# Patient Record
Sex: FEMALE | Race: WHITE
[De-identification: names, ages, dates, MRNs, and addresses within clinical notes are randomized per-mention and may not be internally consistent; named-entity substitution may affect disease eponyms.]

---

## 2017-02-14 ENCOUNTER — HOSPITAL ENCOUNTER (OUTPATIENT)
Dept: HOSPITAL 53 - M WUC | Age: 52
End: 2017-02-14
Attending: FAMILY MEDICINE
Payer: COMMERCIAL

## 2017-02-14 DIAGNOSIS — E03.9: Primary | ICD-10-CM

## 2017-02-14 LAB
CHOLEST SERPL-MCNC: 229 MG/DL (ref ?–200)
TRIGL SERPL-MCNC: 170 MG/DL (ref ?–150)

## 2017-07-11 ENCOUNTER — HOSPITAL ENCOUNTER (EMERGENCY)
Dept: HOSPITAL 53 - M ED | Age: 52
LOS: 1 days | Discharge: HOME | End: 2017-07-12
Payer: COMMERCIAL

## 2017-07-11 VITALS — HEIGHT: 63 IN | BODY MASS INDEX: 22.77 KG/M2 | WEIGHT: 128.53 LBS

## 2017-07-11 VITALS — SYSTOLIC BLOOD PRESSURE: 132 MMHG | DIASTOLIC BLOOD PRESSURE: 72 MMHG

## 2017-07-11 DIAGNOSIS — Z79.899: ICD-10-CM

## 2017-07-11 DIAGNOSIS — E11.9: ICD-10-CM

## 2017-07-11 DIAGNOSIS — E03.9: ICD-10-CM

## 2017-07-11 DIAGNOSIS — Z91.041: ICD-10-CM

## 2017-07-11 DIAGNOSIS — F17.210: ICD-10-CM

## 2017-07-11 DIAGNOSIS — L50.9: Primary | ICD-10-CM

## 2017-08-23 ENCOUNTER — HOSPITAL ENCOUNTER (OUTPATIENT)
Dept: HOSPITAL 53 - M WUC | Age: 52
End: 2017-08-23
Attending: FAMILY MEDICINE
Payer: COMMERCIAL

## 2017-08-23 DIAGNOSIS — E55.9: Primary | ICD-10-CM

## 2017-08-23 DIAGNOSIS — E03.9: ICD-10-CM

## 2017-08-23 DIAGNOSIS — R53.83: ICD-10-CM

## 2018-01-29 ENCOUNTER — HOSPITAL ENCOUNTER (OUTPATIENT)
Dept: HOSPITAL 53 - M WUC | Age: 53
End: 2018-01-29
Attending: FAMILY MEDICINE
Payer: COMMERCIAL

## 2018-01-29 DIAGNOSIS — E55.9: Primary | ICD-10-CM

## 2018-01-29 DIAGNOSIS — Z86.11: ICD-10-CM

## 2018-01-29 LAB — THYROID STIMULATING HORMONE: 0.54 UIU/ML (ref 0.36–3.74)

## 2018-01-29 PROCEDURE — 84443 ASSAY THYROID STIM HORMONE: CPT

## 2019-09-05 ENCOUNTER — HOSPITAL ENCOUNTER (EMERGENCY)
Dept: HOSPITAL 53 - M ED | Age: 54
Discharge: HOME | End: 2019-09-05
Payer: SELF-PAY

## 2019-09-05 VITALS — BODY MASS INDEX: 20.6 KG/M2 | WEIGHT: 116.25 LBS | HEIGHT: 63 IN

## 2019-09-05 VITALS — DIASTOLIC BLOOD PRESSURE: 78 MMHG | SYSTOLIC BLOOD PRESSURE: 142 MMHG

## 2019-09-05 DIAGNOSIS — Z91.041: ICD-10-CM

## 2019-09-05 DIAGNOSIS — Z79.899: ICD-10-CM

## 2019-09-05 DIAGNOSIS — M54.5: Primary | ICD-10-CM

## 2019-09-05 DIAGNOSIS — Z72.0: ICD-10-CM

## 2019-09-05 DIAGNOSIS — Z91.89: ICD-10-CM

## 2019-09-05 DIAGNOSIS — Z91.030: ICD-10-CM

## 2019-09-05 PROCEDURE — 96375 TX/PRO/DX INJ NEW DRUG ADDON: CPT

## 2019-09-05 PROCEDURE — 99284 EMERGENCY DEPT VISIT MOD MDM: CPT

## 2019-09-05 PROCEDURE — 96374 THER/PROPH/DIAG INJ IV PUSH: CPT

## 2020-06-09 ENCOUNTER — HOSPITAL ENCOUNTER (OUTPATIENT)
Dept: HOSPITAL 53 - M WUC | Age: 55
End: 2020-06-09
Attending: NURSE PRACTITIONER
Payer: COMMERCIAL

## 2020-06-09 DIAGNOSIS — E07.9: ICD-10-CM

## 2020-06-09 DIAGNOSIS — Z13.220: ICD-10-CM

## 2020-06-09 DIAGNOSIS — Z13.0: Primary | ICD-10-CM

## 2020-06-09 DIAGNOSIS — Z71.7: ICD-10-CM

## 2020-06-09 DIAGNOSIS — E55.9: ICD-10-CM

## 2020-06-09 LAB
25(OH)D3 SERPL-MCNC: 23.7 NG/ML (ref 30–100)
ALBUMIN SERPL BCG-MCNC: 3.8 GM/DL (ref 3.2–5.2)
ALT SERPL W P-5'-P-CCNC: 26 U/L (ref 12–78)
BILIRUB SERPL-MCNC: 0.4 MG/DL (ref 0.2–1)
BUN SERPL-MCNC: 16 MG/DL (ref 7–18)
CALCIUM SERPL-MCNC: 9 MG/DL (ref 8.5–10.1)
CHLORIDE SERPL-SCNC: 106 MEQ/L (ref 98–107)
CHOLEST SERPL-MCNC: 270 MG/DL (ref ?–200)
CHOLEST/HDLC SERPL: 4.5 {RATIO} (ref ?–5)
CO2 SERPL-SCNC: 29 MEQ/L (ref 21–32)
CREAT SERPL-MCNC: 1.06 MG/DL (ref 0.55–1.3)
GFR SERPL CREATININE-BSD FRML MDRD: 57.1 ML/MIN/{1.73_M2} (ref 51–?)
GLUCOSE SERPL-MCNC: 74 MG/DL (ref 70–100)
HCT VFR BLD AUTO: 39.4 % (ref 36–47)
HDLC SERPL-MCNC: 60 MG/DL (ref 40–?)
HGB BLD-MCNC: 13 G/DL (ref 12–15.5)
LDLC SERPL CALC-MCNC: 193 MG/DL (ref ?–100)
MCH RBC QN AUTO: 32.5 PG (ref 27–33)
MCHC RBC AUTO-ENTMCNC: 33 G/DL (ref 32–36.5)
MCV RBC AUTO: 98.5 FL (ref 80–96)
NONHDLC SERPL-MCNC: 210 MG/DL
PLATELET # BLD AUTO: 325 10^3/UL (ref 150–450)
POTASSIUM SERPL-SCNC: 4.3 MEQ/L (ref 3.5–5.1)
PROT SERPL-MCNC: 7.1 GM/DL (ref 6.4–8.2)
RBC # BLD AUTO: 4 10^6/UL (ref 4–5.4)
SODIUM SERPL-SCNC: 138 MEQ/L (ref 136–145)
T4 SERPL-MCNC: 1.8 UG/DL (ref 4.5–12)
TRIGL SERPL-MCNC: 86 MG/DL (ref ?–150)
TSH SERPL DL<=0.005 MIU/L-ACNC: 69 UIU/ML (ref 0.36–3.74)
WBC # BLD AUTO: 9.4 10^3/UL (ref 4–10)

## 2020-11-02 ENCOUNTER — HOSPITAL ENCOUNTER (OUTPATIENT)
Dept: HOSPITAL 53 - M LAB REF | Age: 55
End: 2020-11-02
Attending: PHYSICIAN ASSISTANT
Payer: COMMERCIAL

## 2020-11-02 DIAGNOSIS — L57.0: Primary | ICD-10-CM

## 2021-01-05 ENCOUNTER — HOSPITAL ENCOUNTER (OUTPATIENT)
Dept: HOSPITAL 53 - M WUC | Age: 56
End: 2021-01-05
Attending: NURSE PRACTITIONER
Payer: COMMERCIAL

## 2021-01-05 DIAGNOSIS — E07.9: Primary | ICD-10-CM

## 2021-01-05 LAB
FT4I SERPL CALC-MCNC: 3.5 % (ref 1.3–4.8)
T3 SERPL-MCNC: 84.3 NG/DL (ref 60–181)
T3RU NFR SERPL: 36 % (ref 30–39)
T4 FREE SERPL-MCNC: 1.43 NG/DL (ref 0.76–1.46)
T4 SERPL-MCNC: 9.6 UG/DL (ref 4.5–12)
TSH SERPL DL<=0.005 MIU/L-ACNC: 0.28 UIU/ML (ref 0.36–3.74)

## 2021-03-18 ENCOUNTER — HOSPITAL ENCOUNTER (OUTPATIENT)
Dept: HOSPITAL 53 - M WUC | Age: 56
End: 2021-03-18
Attending: NURSE PRACTITIONER
Payer: COMMERCIAL

## 2021-03-18 DIAGNOSIS — R94.6: Primary | ICD-10-CM

## 2021-03-18 LAB
T4 FREE SERPL-MCNC: 1.4 NG/DL (ref 0.76–1.46)
TSH SERPL DL<=0.005 MIU/L-ACNC: 0.34 UIU/ML (ref 0.36–3.74)

## 2021-04-12 ENCOUNTER — HOSPITAL ENCOUNTER (OUTPATIENT)
Dept: HOSPITAL 53 - M WUC | Age: 56
End: 2021-04-12
Attending: PHYSICIAN ASSISTANT
Payer: COMMERCIAL

## 2021-04-12 DIAGNOSIS — M54.6: Primary | ICD-10-CM

## 2021-04-12 NOTE — REP
INDICATION:

PAIN.



COMPARISON:

None.



TECHNIQUE:

Plain films of the thoracic spine.



FINDINGS:

No fracture or malalignment.  Vertebral heights are overall preserved.  There is

degenerative disc disease with loss of disc height.

Visualized lungs are clear.

Soft tissues are unremarkable.



IMPRESSION:

No fracture or malalignment.  Degenerative changes.





<Electronically signed by Juan Monreal > 04/12/21 0934

## 2021-07-02 ENCOUNTER — HOSPITAL ENCOUNTER (EMERGENCY)
Dept: HOSPITAL 53 - M ED | Age: 56
Discharge: HOME | End: 2021-07-02
Payer: COMMERCIAL

## 2021-07-02 VITALS — WEIGHT: 128.53 LBS | HEIGHT: 63 IN | BODY MASS INDEX: 22.77 KG/M2

## 2021-07-02 VITALS — DIASTOLIC BLOOD PRESSURE: 79 MMHG | SYSTOLIC BLOOD PRESSURE: 124 MMHG

## 2021-07-02 DIAGNOSIS — F17.200: ICD-10-CM

## 2021-07-02 DIAGNOSIS — Z79.899: ICD-10-CM

## 2021-07-02 DIAGNOSIS — Z91.041: ICD-10-CM

## 2021-07-02 DIAGNOSIS — M54.9: Primary | ICD-10-CM

## 2021-07-02 DIAGNOSIS — Z91.89: ICD-10-CM

## 2021-07-02 DIAGNOSIS — Z91.030: ICD-10-CM

## 2021-07-02 LAB
BASOPHILS # BLD AUTO: 0.1 10^3/UL (ref 0–0.2)
BASOPHILS NFR BLD AUTO: 0.9 % (ref 0–1)
EOSINOPHIL # BLD AUTO: 0.2 10^3/UL (ref 0–0.5)
EOSINOPHIL NFR BLD AUTO: 1.8 % (ref 0–3)
HCT VFR BLD AUTO: 40 % (ref 36–47)
HGB BLD-MCNC: 13.1 G/DL (ref 12–15.5)
LYMPHOCYTES # BLD AUTO: 3.1 10^3/UL (ref 1.5–5)
LYMPHOCYTES NFR BLD AUTO: 31.1 % (ref 24–44)
MCH RBC QN AUTO: 29.6 PG (ref 27–33)
MCHC RBC AUTO-ENTMCNC: 32.8 G/DL (ref 32–36.5)
MCV RBC AUTO: 90.5 FL (ref 80–96)
MONOCYTES # BLD AUTO: 0.6 10^3/UL (ref 0–0.8)
MONOCYTES NFR BLD AUTO: 5.6 % (ref 2–8)
NEUTROPHILS # BLD AUTO: 6 10^3/UL (ref 1.5–8.5)
NEUTROPHILS NFR BLD AUTO: 60.2 % (ref 36–66)
PLATELET # BLD AUTO: 330 10^3/UL (ref 150–450)
RBC # BLD AUTO: 4.42 10^6/UL (ref 4–5.4)
WBC # BLD AUTO: 10 10^3/UL (ref 4–10)

## 2021-07-12 ENCOUNTER — HOSPITAL ENCOUNTER (OUTPATIENT)
Dept: HOSPITAL 53 - M WUC | Age: 56
End: 2021-07-12
Attending: NURSE PRACTITIONER
Payer: COMMERCIAL

## 2021-07-12 DIAGNOSIS — E89.0: Primary | ICD-10-CM

## 2021-07-12 LAB
T4 FREE SERPL-MCNC: 1.47 NG/DL (ref 0.76–1.46)
TSH SERPL DL<=0.005 MIU/L-ACNC: 0.5 UIU/ML (ref 0.36–3.74)

## 2021-09-30 ENCOUNTER — HOSPITAL ENCOUNTER (OUTPATIENT)
Dept: HOSPITAL 53 - M WUC | Age: 56
End: 2021-09-30
Attending: ORTHOPAEDIC SURGERY
Payer: COMMERCIAL

## 2021-09-30 DIAGNOSIS — M51.34: Primary | ICD-10-CM

## 2021-09-30 LAB
BASOPHILS # BLD AUTO: 0.1 10^3/UL (ref 0–0.2)
BASOPHILS NFR BLD AUTO: 1.4 % (ref 0–1)
CRP SERPL-MCNC: < 0.3 MG/DL (ref 0–0.3)
EOSINOPHIL # BLD AUTO: 0.1 10^3/UL (ref 0–0.5)
EOSINOPHIL NFR BLD AUTO: 1.6 % (ref 0–3)
ERYTHROCYTE [SEDIMENTATION RATE] IN BLOOD BY WESTERGREN METHOD: 11 MM/HR (ref 0–30)
HCT VFR BLD AUTO: 45.7 % (ref 36–47)
HGB BLD-MCNC: 14.7 G/DL (ref 12–15.5)
LYMPHOCYTES # BLD AUTO: 3.6 10^3/UL (ref 1.5–5)
LYMPHOCYTES NFR BLD AUTO: 43.7 % (ref 24–44)
MCH RBC QN AUTO: 29.7 PG (ref 27–33)
MCHC RBC AUTO-ENTMCNC: 32.2 G/DL (ref 32–36.5)
MCV RBC AUTO: 92.3 FL (ref 80–96)
MONOCYTES # BLD AUTO: 0.4 10^3/UL (ref 0–0.8)
MONOCYTES NFR BLD AUTO: 4.9 % (ref 2–8)
NEUTROPHILS # BLD AUTO: 4 10^3/UL (ref 1.5–8.5)
NEUTROPHILS NFR BLD AUTO: 48.2 % (ref 36–66)
PLATELET # BLD AUTO: 382 10^3/UL (ref 150–450)
PROT SERPL-MCNC: 7.2 GM/DL (ref 6.4–8.2)
RBC # BLD AUTO: 4.95 10^6/UL (ref 4–5.4)
RHEUMATOID FACT SERPL-ACNC: < 10 IU/ML (ref ?–15)
WBC # BLD AUTO: 8.3 10^3/UL (ref 4–10)

## 2022-01-07 ENCOUNTER — HOSPITAL ENCOUNTER (OUTPATIENT)
Dept: HOSPITAL 53 - M WUC | Age: 57
End: 2022-01-07
Attending: NURSE PRACTITIONER
Payer: COMMERCIAL

## 2022-01-07 DIAGNOSIS — E89.0: Primary | ICD-10-CM

## 2022-01-07 LAB
T4 FREE SERPL-MCNC: 1.24 NG/DL (ref 0.76–1.46)
TSH SERPL DL<=0.005 MIU/L-ACNC: 1.2 UIU/ML (ref 0.36–3.74)

## 2022-03-16 ENCOUNTER — HOSPITAL ENCOUNTER (OUTPATIENT)
Dept: HOSPITAL 53 - M PLALAB | Age: 57
End: 2022-03-16
Attending: NURSE PRACTITIONER
Payer: COMMERCIAL

## 2022-03-16 DIAGNOSIS — Z20.1: Primary | ICD-10-CM

## 2022-03-16 DIAGNOSIS — E55.9: ICD-10-CM

## 2022-07-07 ENCOUNTER — HOSPITAL ENCOUNTER (OUTPATIENT)
Dept: HOSPITAL 53 - M PLALAB | Age: 57
End: 2022-07-07
Attending: NURSE PRACTITIONER
Payer: COMMERCIAL

## 2022-07-07 DIAGNOSIS — E89.0: ICD-10-CM

## 2022-07-07 DIAGNOSIS — E78.2: ICD-10-CM

## 2022-07-07 DIAGNOSIS — E55.9: Primary | ICD-10-CM

## 2022-07-07 LAB
25(OH)D3 SERPL-MCNC: 56.2 NG/ML (ref 30–100)
ALBUMIN SERPL BCG-MCNC: 3.7 GM/DL (ref 3.2–5.2)
ALT SERPL W P-5'-P-CCNC: 21 U/L (ref 12–78)
BILIRUB SERPL-MCNC: 0.2 MG/DL (ref 0.2–1)
BUN SERPL-MCNC: 11 MG/DL (ref 7–18)
CALCIUM SERPL-MCNC: 9.6 MG/DL (ref 8.5–10.1)
CHLORIDE SERPL-SCNC: 106 MEQ/L (ref 98–107)
CHOLEST SERPL-MCNC: 302 MG/DL (ref ?–200)
CHOLEST/HDLC SERPL: 5.12 {RATIO} (ref ?–5)
CO2 SERPL-SCNC: 28 MEQ/L (ref 21–32)
CREAT SERPL-MCNC: 0.85 MG/DL (ref 0.55–1.3)
GFR SERPL CREATININE-BSD FRML MDRD: > 60 ML/MIN/{1.73_M2} (ref 51–?)
GLUCOSE SERPL-MCNC: 87 MG/DL (ref 70–100)
HDLC SERPL-MCNC: 59 MG/DL (ref 40–?)
LDLC SERPL CALC-MCNC: 224 MG/DL (ref ?–100)
NONHDLC SERPL-MCNC: 243 MG/DL
POTASSIUM SERPL-SCNC: 4.4 MEQ/L (ref 3.5–5.1)
PROT SERPL-MCNC: 7 GM/DL (ref 6.4–8.2)
SODIUM SERPL-SCNC: 141 MEQ/L (ref 136–145)
TRIGL SERPL-MCNC: 95 MG/DL (ref ?–150)
TSH SERPL DL<=0.005 MIU/L-ACNC: 0.97 UIU/ML (ref 0.36–3.74)

## 2023-01-16 ENCOUNTER — HOSPITAL ENCOUNTER (OUTPATIENT)
Dept: HOSPITAL 53 - M PLALAB | Age: 58
End: 2023-01-16
Attending: NURSE PRACTITIONER
Payer: COMMERCIAL

## 2023-01-16 DIAGNOSIS — E55.9: Primary | ICD-10-CM

## 2023-01-16 DIAGNOSIS — E89.0: ICD-10-CM

## 2023-01-16 DIAGNOSIS — E78.2: ICD-10-CM

## 2023-01-16 LAB
25(OH)D3 SERPL-MCNC: 48.6 NG/ML (ref 20–100)
ALBUMIN SERPL BCG-MCNC: 3.8 G/DL (ref 3.2–5.2)
ALP SERPL-CCNC: 121 U/L (ref 46–116)
ALT SERPL W P-5'-P-CCNC: 13 U/L (ref 7–40)
AST SERPL-CCNC: 19 U/L (ref ?–34)
BILIRUB SERPL-MCNC: 0.4 MG/DL (ref 0.3–1.2)
BUN SERPL-MCNC: 14 MG/DL (ref 9–23)
CALCIUM SERPL-MCNC: 9.7 MG/DL (ref 8.5–10.1)
CHLORIDE SERPL-SCNC: 105 MMOL/L (ref 98–107)
CO2 SERPL-SCNC: 27 MMOL/L (ref 20–31)
CREAT SERPL-MCNC: 0.87 MG/DL (ref 0.55–1.3)
GFR SERPL CREATININE-BSD FRML MDRD: > 60 ML/MIN/{1.73_M2} (ref 51–?)
GLUCOSE SERPL-MCNC: 89 MG/DL (ref 60–100)
POTASSIUM SERPL-SCNC: 4.4 MMOL/L (ref 3.5–5.1)
PROT SERPL-MCNC: 7.1 G/DL (ref 5.7–8.2)
SODIUM SERPL-SCNC: 139 MMOL/L (ref 136–145)
TSH SERPL DL<=0.005 MIU/L-ACNC: 0.25 UIU/ML (ref 0.55–4.78)

## 2023-03-16 ENCOUNTER — HOSPITAL ENCOUNTER (OUTPATIENT)
Dept: HOSPITAL 53 - M PLALAB | Age: 58
End: 2023-03-16
Attending: NURSE PRACTITIONER
Payer: COMMERCIAL

## 2023-03-16 DIAGNOSIS — E89.0: Primary | ICD-10-CM

## 2023-09-04 ENCOUNTER — HOSPITAL ENCOUNTER (EMERGENCY)
Dept: HOSPITAL 53 - M ED | Age: 58
Discharge: HOME | End: 2023-09-04
Payer: COMMERCIAL

## 2023-09-04 VITALS — DIASTOLIC BLOOD PRESSURE: 69 MMHG | OXYGEN SATURATION: 99 % | TEMPERATURE: 98.2 F | SYSTOLIC BLOOD PRESSURE: 156 MMHG

## 2023-09-04 DIAGNOSIS — Z79.82: ICD-10-CM

## 2023-09-04 DIAGNOSIS — S82.042A: Primary | ICD-10-CM

## 2023-09-04 DIAGNOSIS — M25.462: ICD-10-CM

## 2023-09-04 DIAGNOSIS — Z91.048: ICD-10-CM

## 2023-09-04 DIAGNOSIS — E03.9: ICD-10-CM

## 2023-09-04 DIAGNOSIS — Z91.041: ICD-10-CM

## 2023-09-04 DIAGNOSIS — Z91.030: ICD-10-CM
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
## 2023-09-04 DIAGNOSIS — F17.200: ICD-10-CM

## 2023-09-04 DIAGNOSIS — Z79.899: ICD-10-CM

## 2023-09-04 PROCEDURE — 99284 EMERGENCY DEPT VISIT MOD MDM: CPT

## 2023-09-04 PROCEDURE — 73552 X-RAY EXAM OF FEMUR 2/>: CPT

## 2023-09-04 PROCEDURE — 73564 X-RAY EXAM KNEE 4 OR MORE: CPT

## 2023-09-04 PROCEDURE — 96374 THER/PROPH/DIAG INJ IV PUSH: CPT

## 2023-09-04 PROCEDURE — 73521 X-RAY EXAM HIPS BI 2 VIEWS: CPT

## 2023-09-04 PROCEDURE — 96375 TX/PRO/DX INJ NEW DRUG ADDON: CPT

## 2023-09-07 ENCOUNTER — HOSPITAL ENCOUNTER (OUTPATIENT)
Dept: HOSPITAL 53 - M PLALAB | Age: 58
End: 2023-09-07
Attending: ORTHOPAEDIC SURGERY
Payer: COMMERCIAL

## 2023-09-07 DIAGNOSIS — S82.002A: Primary | ICD-10-CM

## 2023-09-07 LAB
ALBUMIN SERPL BCG-MCNC: 3.5 G/DL (ref 3.2–5.2)
ALP SERPL-CCNC: 119 U/L (ref 46–116)
ALT SERPL W P-5'-P-CCNC: 13 U/L (ref 7–40)
AST SERPL-CCNC: 15 U/L (ref ?–34)
BASOPHILS # BLD AUTO: 0.1 10^3/UL (ref 0–0.2)
BASOPHILS NFR BLD AUTO: 1.1 % (ref 0–1)
BILIRUB SERPL-MCNC: 0.5 MG/DL (ref 0.3–1.2)
BUN SERPL-MCNC: 10 MG/DL (ref 9–23)
CALCIUM SERPL-MCNC: 9.3 MG/DL (ref 8.3–10.6)
CHLORIDE SERPL-SCNC: 105 MMOL/L (ref 98–107)
CO2 SERPL-SCNC: 26 MMOL/L (ref 20–31)
CREAT SERPL-MCNC: 0.84 MG/DL (ref 0.55–1.3)
EOSINOPHIL # BLD AUTO: 0.1 10^3/UL (ref 0–0.5)
EOSINOPHIL NFR BLD AUTO: 0.9 % (ref 0–3)
GFR SERPL CREATININE-BSD FRML MDRD: > 60 ML/MIN/{1.73_M2} (ref 45–?)
GLUCOSE SERPL-MCNC: 85 MG/DL (ref 74–106)
HCT VFR BLD AUTO: 36.1 % (ref 36–47)
HGB BLD-MCNC: 11.6 G/DL (ref 12–15.5)
INR PPP: 1.06
LYMPHOCYTES # BLD AUTO: 3.2 10^3/UL (ref 1.5–5)
LYMPHOCYTES NFR BLD AUTO: 33.8 % (ref 24–44)
MCH RBC QN AUTO: 29.9 PG (ref 27–33)
MCHC RBC AUTO-ENTMCNC: 32.1 G/DL (ref 32–36.5)
MCV RBC AUTO: 93 FL (ref 80–96)
MONOCYTES # BLD AUTO: 0.6 10^3/UL (ref 0–0.8)
MONOCYTES NFR BLD AUTO: 6.7 % (ref 2–8)
NEUTROPHILS # BLD AUTO: 5.3 10^3/UL (ref 1.5–8.5)
NEUTROPHILS NFR BLD AUTO: 57.2 % (ref 36–66)
PLATELET # BLD AUTO: 327 10^3/UL (ref 150–450)
POTASSIUM SERPL-SCNC: 4.6 MMOL/L (ref 3.5–5.1)
PROT SERPL-MCNC: 6.6 G/DL (ref 5.7–8.2)
PROTHROMBIN TIME: 13.5 SECONDS (ref 12.5–14.5)
RBC # BLD AUTO: 3.88 10^6/UL (ref 4–5.4)
SODIUM SERPL-SCNC: 136 MMOL/L (ref 136–145)
WBC # BLD AUTO: 9.3 10^3/UL (ref 4–10)

## 2023-09-12 ENCOUNTER — HOSPITAL ENCOUNTER (OUTPATIENT)
Dept: HOSPITAL 53 - M SDC | Age: 58
Discharge: HOME | End: 2023-09-12
Attending: ORTHOPAEDIC SURGERY
Payer: COMMERCIAL

## 2023-09-12 VITALS — WEIGHT: 130 LBS | HEIGHT: 63 IN | BODY MASS INDEX: 23.04 KG/M2

## 2023-09-12 VITALS — OXYGEN SATURATION: 95 % | SYSTOLIC BLOOD PRESSURE: 141 MMHG | TEMPERATURE: 98.6 F | DIASTOLIC BLOOD PRESSURE: 71 MMHG

## 2023-09-12 DIAGNOSIS — Z91.030: ICD-10-CM

## 2023-09-12 DIAGNOSIS — F17.210: ICD-10-CM

## 2023-09-12 DIAGNOSIS — Z91.041: ICD-10-CM

## 2023-09-12 DIAGNOSIS — Z91.048: ICD-10-CM
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
## 2023-09-12 DIAGNOSIS — S82.042A: Primary | ICD-10-CM
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
## 2023-09-12 DIAGNOSIS — Z79.899: ICD-10-CM

## 2023-09-12 DIAGNOSIS — Z79.82: ICD-10-CM

## 2023-09-12 PROCEDURE — 76000 FLUOROSCOPY <1 HR PHYS/QHP: CPT

## 2023-09-12 PROCEDURE — 93005 ELECTROCARDIOGRAM TRACING: CPT

## 2023-09-12 PROCEDURE — 27524 TREAT KNEECAP FRACTURE: CPT

## 2023-09-12 RX ADMIN — HYDROMORPHONE HYDROCHLORIDE PRN MG: 1 INJECTION, SOLUTION INTRAMUSCULAR; INTRAVENOUS; SUBCUTANEOUS at 14:45

## 2023-09-12 RX ADMIN — HYDROMORPHONE HYDROCHLORIDE PRN MG: 1 INJECTION, SOLUTION INTRAMUSCULAR; INTRAVENOUS; SUBCUTANEOUS at 14:30

## 2023-10-09 ENCOUNTER — HOSPITAL ENCOUNTER (OUTPATIENT)
Dept: HOSPITAL 53 - M SOG | Age: 58
End: 2023-10-09
Attending: ORTHOPAEDIC SURGERY
Payer: COMMERCIAL

## 2023-10-09 DIAGNOSIS — S82.042D: Primary | ICD-10-CM

## 2023-10-26 ENCOUNTER — HOSPITAL ENCOUNTER (OUTPATIENT)
Dept: HOSPITAL 53 - M SOG | Age: 58
End: 2023-10-26
Attending: ORTHOPAEDIC SURGERY
Payer: COMMERCIAL

## 2023-10-26 DIAGNOSIS — S82.042D: Primary | ICD-10-CM

## 2023-12-07 ENCOUNTER — HOSPITAL ENCOUNTER (OUTPATIENT)
Dept: HOSPITAL 53 - M SOG | Age: 58
End: 2023-12-07
Attending: ORTHOPAEDIC SURGERY
Payer: COMMERCIAL

## 2023-12-07 DIAGNOSIS — S82.042D: Primary | ICD-10-CM

## 2023-12-13 ENCOUNTER — HOSPITAL ENCOUNTER (OUTPATIENT)
Dept: HOSPITAL 53 - M SOG | Age: 58
End: 2023-12-13
Attending: ORTHOPAEDIC SURGERY
Payer: COMMERCIAL

## 2023-12-13 DIAGNOSIS — S82.042D: Primary | ICD-10-CM

## 2024-01-03 ENCOUNTER — HOSPITAL ENCOUNTER (OUTPATIENT)
Dept: HOSPITAL 53 - M PLALAB | Age: 59
End: 2024-01-03
Attending: NURSE PRACTITIONER
Payer: COMMERCIAL

## 2024-01-03 DIAGNOSIS — Z20.1: Primary | ICD-10-CM

## 2024-02-08 ENCOUNTER — HOSPITAL ENCOUNTER (OUTPATIENT)
Dept: HOSPITAL 53 - M SOG | Age: 59
End: 2024-02-08
Attending: ORTHOPAEDIC SURGERY
Payer: COMMERCIAL

## 2024-02-08 DIAGNOSIS — S82.042D: Primary | ICD-10-CM

## 2024-05-08 ENCOUNTER — HOSPITAL ENCOUNTER (OUTPATIENT)
Dept: HOSPITAL 53 - M SOG | Age: 59
End: 2024-05-08
Attending: ORTHOPAEDIC SURGERY
Payer: COMMERCIAL

## 2024-05-08 DIAGNOSIS — S82.042D: Primary | ICD-10-CM

## 2024-06-05 ENCOUNTER — HOSPITAL ENCOUNTER (OUTPATIENT)
Dept: HOSPITAL 53 - M WUC | Age: 59
End: 2024-06-05
Attending: NURSE PRACTITIONER
Payer: COMMERCIAL

## 2024-06-05 DIAGNOSIS — E78.2: Primary | ICD-10-CM

## 2024-06-05 DIAGNOSIS — E55.9: ICD-10-CM

## 2024-06-05 LAB
25(OH)D3 SERPL-MCNC: 58.3 NG/ML (ref 20–100)
ALBUMIN SERPL BCG-MCNC: 3.5 G/DL (ref 3.2–5.2)
ALP SERPL-CCNC: 137 U/L (ref 46–116)
ALT SERPL W P-5'-P-CCNC: 17 U/L (ref 7–40)
AST SERPL-CCNC: 13 U/L (ref ?–34)
BILIRUB SERPL-MCNC: 0.2 MG/DL (ref 0.3–1.2)
BUN SERPL-MCNC: 27 MG/DL (ref 9–23)
CALCIUM SERPL-MCNC: 9.5 MG/DL (ref 8.3–10.6)
CHLORIDE SERPL-SCNC: 108 MMOL/L (ref 98–107)
CHOLEST SERPL-MCNC: 290 MG/DL (ref ?–200)
CHOLEST/HDLC SERPL: 5.5 {RATIO} (ref ?–5)
CO2 SERPL-SCNC: 25 MMOL/L (ref 20–31)
CREAT SERPL-MCNC: 0.8 MG/DL (ref 0.55–1.3)
GFR SERPL CREATININE-BSD FRML MDRD: > 60 ML/MIN/{1.73_M2} (ref 45–?)
GLUCOSE SERPL-MCNC: 83 MG/DL (ref 74–106)
HDLC SERPL-MCNC: 52.7 MG/DL (ref 40–?)
LDLC SERPL CALC-MCNC: 212.9 MG/DL (ref ?–100)
NONHDLC SERPL-MCNC: 237.3 MG/DL
POTASSIUM SERPL-SCNC: 4.4 MMOL/L (ref 3.5–5.1)
PROT SERPL-MCNC: 6.6 G/DL (ref 5.7–8.2)
SODIUM SERPL-SCNC: 138 MMOL/L (ref 136–145)
T4 FREE SERPL-MCNC: 1.6 NG/DL (ref 0.89–1.76)
TRIGL SERPL-MCNC: 122 MG/DL (ref ?–150)
TSH SERPL DL<=0.005 MIU/L-ACNC: 0.43 UIU/ML (ref 0.55–4.78)

## 2024-08-29 ENCOUNTER — HOSPITAL ENCOUNTER (OUTPATIENT)
Dept: HOSPITAL 53 - M WUC | Age: 59
End: 2024-08-29
Attending: NURSE PRACTITIONER
Payer: COMMERCIAL

## 2024-08-29 DIAGNOSIS — E03.9: Primary | ICD-10-CM

## 2024-08-29 LAB
T4 FREE SERPL-MCNC: 1.34 NG/DL (ref 0.89–1.76)
TSH SERPL DL<=0.005 MIU/L-ACNC: 3.72 UIU/ML (ref 0.55–4.78)

## 2024-11-08 ENCOUNTER — HOSPITAL ENCOUNTER (OUTPATIENT)
Dept: HOSPITAL 53 - M SOG | Age: 59
End: 2024-11-08
Attending: ORTHOPAEDIC SURGERY
Payer: COMMERCIAL

## 2024-11-08 DIAGNOSIS — S82.042D: Primary | ICD-10-CM

## 2025-01-09 ENCOUNTER — HOSPITAL ENCOUNTER (OUTPATIENT)
Dept: HOSPITAL 53 - M PLALAB | Age: 60
End: 2025-01-09
Attending: NURSE PRACTITIONER
Payer: COMMERCIAL

## 2025-01-09 DIAGNOSIS — E78.2: Primary | ICD-10-CM

## 2025-01-09 DIAGNOSIS — E89.0: ICD-10-CM

## 2025-01-09 DIAGNOSIS — Z20.1: ICD-10-CM

## 2025-01-09 DIAGNOSIS — E55.9: ICD-10-CM

## 2025-01-09 LAB
25(OH)D3 SERPL-MCNC: 75.1 NG/ML (ref 20–100)
ALBUMIN SERPL BCG-MCNC: 3.6 G/DL (ref 3.2–5.2)
ALP SERPL-CCNC: 157 U/L (ref 35–104)
ALT SERPL W P-5'-P-CCNC: 11 U/L (ref 7–40)
AST SERPL-CCNC: 12 U/L (ref ?–34)
BILIRUB SERPL-MCNC: 0.2 MG/DL (ref 0.3–1.2)
BUN SERPL-MCNC: 12 MG/DL (ref 9–23)
CALCIUM SERPL-MCNC: 10 MG/DL (ref 8.3–10.6)
CHLORIDE SERPL-SCNC: 106 MMOL/L (ref 98–107)
CHOLEST SERPL-MCNC: 280 MG/DL (ref ?–200)
CHOLEST/HDLC SERPL: 5.36 {RATIO} (ref ?–5)
CO2 SERPL-SCNC: 29 MMOL/L (ref 20–31)
CREAT SERPL-MCNC: 0.76 MG/DL (ref 0.55–1.3)
EST. AVERAGE GLUCOSE BLD GHB EST-MCNC: 108 MG/DL (ref 60–110)
GFR SERPL CREATININE-BSD FRML MDRD: > 60 ML/MIN/{1.73_M2} (ref 45–?)
GLUCOSE SERPL-MCNC: 76 MG/DL (ref 74–106)
HDLC SERPL-MCNC: 52.2 MG/DL (ref 40–?)
LDLC SERPL CALC-MCNC: 195.8 MG/DL (ref ?–100)
NONHDLC SERPL-MCNC: 227.8 MG/DL
POTASSIUM SERPL-SCNC: 4.4 MMOL/L (ref 3.5–5.1)
PROT SERPL-MCNC: 7.3 G/DL (ref 5.7–8.2)
SODIUM SERPL-SCNC: 143 MMOL/L (ref 136–145)
T4 FREE SERPL-MCNC: 1.38 NG/DL (ref 0.89–1.76)
TRIGL SERPL-MCNC: 160 MG/DL (ref ?–150)
TSH SERPL DL<=0.005 MIU/L-ACNC: 3.35 UIU/ML (ref 0.55–4.78)

## 2025-01-20 ENCOUNTER — HOSPITAL ENCOUNTER (OUTPATIENT)
Dept: HOSPITAL 53 - M WHC | Age: 60
End: 2025-01-20
Attending: NURSE PRACTITIONER
Payer: COMMERCIAL

## 2025-01-20 DIAGNOSIS — Z12.31: Primary | ICD-10-CM

## 2025-07-03 ENCOUNTER — HOSPITAL ENCOUNTER (OUTPATIENT)
Dept: HOSPITAL 53 - M PLALAB | Age: 60
End: 2025-07-03
Attending: NURSE PRACTITIONER
Payer: COMMERCIAL

## 2025-07-03 DIAGNOSIS — E89.0: Primary | ICD-10-CM

## 2025-07-03 DIAGNOSIS — E55.9: ICD-10-CM

## 2025-07-03 DIAGNOSIS — E78.2: ICD-10-CM

## 2025-07-03 LAB
25(OH)D3 SERPL-MCNC: 80.1 NG/ML (ref 20–100)
ALBUMIN SERPL BCG-MCNC: 3.8 G/DL (ref 3.2–5.2)
ALP SERPL-CCNC: 124 U/L (ref 35–104)
ALT SERPL W P-5'-P-CCNC: 14 U/L (ref 7–40)
AST SERPL-CCNC: 20 U/L (ref ?–34)
BILIRUB SERPL-MCNC: 0.3 MG/DL (ref 0.3–1.2)
BUN SERPL-MCNC: 8 MG/DL (ref 9–23)
CALCIUM SERPL-MCNC: 9.2 MG/DL (ref 8.3–10.6)
CHLORIDE SERPL-SCNC: 107 MMOL/L (ref 98–107)
CHOLEST SERPL-MCNC: 278 MG/DL (ref ?–200)
CHOLEST/HDLC SERPL: 4.73 {RATIO} (ref ?–5)
CO2 SERPL-SCNC: 28 MMOL/L (ref 20–31)
CREAT SERPL-MCNC: 0.82 MG/DL (ref 0.55–1.3)
EST. AVERAGE GLUCOSE BLD GHB EST-MCNC: 108 MG/DL (ref 60–110)
GFR SERPL CREATININE-BSD FRML MDRD: 81.3 ML/MIN/{1.73_M2} (ref 45–?)
GLUCOSE SERPL-MCNC: 86 MG/DL (ref 74–106)
HBA1C MFR BLD: 5.4 % (ref 4–6)
HDLC SERPL-MCNC: 58.7 MG/DL (ref 40–?)
LDLC SERPL CALC-MCNC: 187.3 MG/DL (ref ?–100)
NONHDLC SERPL-MCNC: 219.3 MG/DL
POTASSIUM SERPL-SCNC: 4.6 MMOL/L (ref 3.5–5.1)
PROT SERPL-MCNC: 6.8 G/DL (ref 5.7–8.2)
SODIUM SERPL-SCNC: 142 MMOL/L (ref 136–145)
T4 FREE SERPL-MCNC: 1.39 NG/DL (ref 0.89–1.76)
TRIGL SERPL-MCNC: 160 MG/DL (ref ?–150)
TSH SERPL DL<=0.005 MIU/L-ACNC: 3.61 UIU/ML (ref 0.55–4.78)